# Patient Record
(demographics unavailable — no encounter records)

---

## 2017-01-01 NOTE — PN
Date/Time of Note


Date/Time of Note


DATE: 17 


TIME: 08:33





[place] [facility]


Subjective Findings


Other Findings


feeding well; pink; comfortable.





Vital Signs


Vital Signs





 Vital Signs








  Date Time  Temp Pulse Resp B/P Pulse Ox O2 Delivery O2 Flow Rate FiO2


 


17 04:35 99.0 138 40     





NPASS Score-Pain: 0





Physical Exam


HEENT:  [place] open,soft,flat, Normocephalic


Lungs:  Clear to auscultation


Heart:  Regular R&R, No murmur


Abdomen:  Soft, No hepatosplenomegaly, No masses


Skin:  No rashes, No signs of jaundice





Assessment


Term :  Girl





Plan


discharge home with mom if stable.











DEBBIE FLOREZ MD 2017 08:36

## 2017-01-01 NOTE — PD.NBNDCI
Provider Discharge Instruction


Pediatrician Information








Follow-up with Physician:   3





 Day/Days











Diet


Breast Feeding Mothers:  Breast Feed Ad Pearl











DEBBIE FLOREZ MD Jan 30, 2017 08:37

## 2017-01-01 NOTE — PN
Date/Time of Note


Date/Time of Note


DATE: 17 


TIME: 08:36





[place] [facility]


Subjective Findings


Other Findings


doing well, breast feeding well





Vital Signs


Vital Signs





 Vital Signs








  Date Time  Temp Pulse Resp B/P Pulse Ox O2 Delivery O2 Flow Rate FiO2


 


17 04:30 98.2 136 44     





NPASS Score-Pain: 0





Physical Exam


HEENT:  [place] open,soft,flat, Normocephalic


Lungs:  Clear to auscultation


Heart:  Regular R&R, No murmur


Abdomen:  Soft, No hepatosplenomegaly, No masses


Skin:  No rashes, Juandice (minimal)





Assessment


Term :  Girl


Assessment:   Jaundice (minimal)





Plan


bilirubin level is pending.











DEBBIE FLOREZ MD 2017 08:38